# Patient Record
Sex: MALE | Race: WHITE | NOT HISPANIC OR LATINO | URBAN - METROPOLITAN AREA
[De-identification: names, ages, dates, MRNs, and addresses within clinical notes are randomized per-mention and may not be internally consistent; named-entity substitution may affect disease eponyms.]

---

## 2017-05-17 ENCOUNTER — EMERGENCY (EMERGENCY)
Facility: HOSPITAL | Age: 42
LOS: 1 days | Discharge: HOME | End: 2017-05-17
Attending: EMERGENCY MEDICINE | Admitting: EMERGENCY MEDICINE
Payer: COMMERCIAL

## 2017-05-17 VITALS
HEART RATE: 72 BPM | DIASTOLIC BLOOD PRESSURE: 82 MMHG | RESPIRATION RATE: 16 BRPM | TEMPERATURE: 98 F | SYSTOLIC BLOOD PRESSURE: 154 MMHG | OXYGEN SATURATION: 100 %

## 2017-05-17 VITALS
DIASTOLIC BLOOD PRESSURE: 80 MMHG | HEART RATE: 76 BPM | OXYGEN SATURATION: 100 % | RESPIRATION RATE: 18 BRPM | SYSTOLIC BLOOD PRESSURE: 164 MMHG | TEMPERATURE: 98 F

## 2017-05-17 DIAGNOSIS — Z98.84 BARIATRIC SURGERY STATUS: Chronic | ICD-10-CM

## 2017-05-17 DIAGNOSIS — Z90.49 ACQUIRED ABSENCE OF OTHER SPECIFIED PARTS OF DIGESTIVE TRACT: Chronic | ICD-10-CM

## 2017-05-17 LAB
ALBUMIN SERPL ELPH-MCNC: 3.8 G/DL — SIGNIFICANT CHANGE UP (ref 3.3–5)
ALP SERPL-CCNC: 196 U/L — HIGH (ref 40–120)
ALT FLD-CCNC: 44 U/L — HIGH (ref 4–41)
APPEARANCE UR: CLEAR — SIGNIFICANT CHANGE UP
AST SERPL-CCNC: 51 U/L — HIGH (ref 4–40)
BASOPHILS # BLD AUTO: 0.04 K/UL — SIGNIFICANT CHANGE UP (ref 0–0.2)
BASOPHILS NFR BLD AUTO: 0.6 % — SIGNIFICANT CHANGE UP (ref 0–2)
BILIRUB SERPL-MCNC: 0.2 MG/DL — SIGNIFICANT CHANGE UP (ref 0.2–1.2)
BILIRUB UR-MCNC: NEGATIVE — SIGNIFICANT CHANGE UP
BLOOD UR QL VISUAL: NEGATIVE — SIGNIFICANT CHANGE UP
BUN SERPL-MCNC: 11 MG/DL — SIGNIFICANT CHANGE UP (ref 7–23)
CALCIUM SERPL-MCNC: 9.1 MG/DL — SIGNIFICANT CHANGE UP (ref 8.4–10.5)
CHLORIDE SERPL-SCNC: 99 MMOL/L — SIGNIFICANT CHANGE UP (ref 98–107)
CO2 SERPL-SCNC: 28 MMOL/L — SIGNIFICANT CHANGE UP (ref 22–31)
COLOR SPEC: SIGNIFICANT CHANGE UP
CREAT SERPL-MCNC: 0.82 MG/DL — SIGNIFICANT CHANGE UP (ref 0.5–1.3)
EOSINOPHIL # BLD AUTO: 0.1 K/UL — SIGNIFICANT CHANGE UP (ref 0–0.5)
EOSINOPHIL NFR BLD AUTO: 1.6 % — SIGNIFICANT CHANGE UP (ref 0–6)
GLUCOSE SERPL-MCNC: 88 MG/DL — SIGNIFICANT CHANGE UP (ref 70–99)
GLUCOSE UR-MCNC: NEGATIVE — SIGNIFICANT CHANGE UP
HCT VFR BLD CALC: 41 % — SIGNIFICANT CHANGE UP (ref 39–50)
HGB BLD-MCNC: 13.4 G/DL — SIGNIFICANT CHANGE UP (ref 13–17)
IMM GRANULOCYTES NFR BLD AUTO: 0.3 % — SIGNIFICANT CHANGE UP (ref 0–1.5)
KETONES UR-MCNC: NEGATIVE — SIGNIFICANT CHANGE UP
LEUKOCYTE ESTERASE UR-ACNC: NEGATIVE — SIGNIFICANT CHANGE UP
LYMPHOCYTES # BLD AUTO: 1.41 K/UL — SIGNIFICANT CHANGE UP (ref 1–3.3)
LYMPHOCYTES # BLD AUTO: 21.9 % — SIGNIFICANT CHANGE UP (ref 13–44)
MCHC RBC-ENTMCNC: 26.7 PG — LOW (ref 27–34)
MCHC RBC-ENTMCNC: 32.7 % — SIGNIFICANT CHANGE UP (ref 32–36)
MCV RBC AUTO: 81.7 FL — SIGNIFICANT CHANGE UP (ref 80–100)
MONOCYTES # BLD AUTO: 0.42 K/UL — SIGNIFICANT CHANGE UP (ref 0–0.9)
MONOCYTES NFR BLD AUTO: 6.5 % — SIGNIFICANT CHANGE UP (ref 2–14)
MUCOUS THREADS # UR AUTO: SIGNIFICANT CHANGE UP
NEUTROPHILS # BLD AUTO: 4.44 K/UL — SIGNIFICANT CHANGE UP (ref 1.8–7.4)
NEUTROPHILS NFR BLD AUTO: 69.1 % — SIGNIFICANT CHANGE UP (ref 43–77)
NITRITE UR-MCNC: NEGATIVE — SIGNIFICANT CHANGE UP
PH UR: 6 — SIGNIFICANT CHANGE UP (ref 4.6–8)
PLATELET # BLD AUTO: 246 K/UL — SIGNIFICANT CHANGE UP (ref 150–400)
PMV BLD: 10.3 FL — SIGNIFICANT CHANGE UP (ref 7–13)
POTASSIUM SERPL-MCNC: 4.5 MMOL/L — SIGNIFICANT CHANGE UP (ref 3.5–5.3)
POTASSIUM SERPL-SCNC: 4.5 MMOL/L — SIGNIFICANT CHANGE UP (ref 3.5–5.3)
PROT SERPL-MCNC: 7.5 G/DL — SIGNIFICANT CHANGE UP (ref 6–8.3)
PROT UR-MCNC: NEGATIVE — SIGNIFICANT CHANGE UP
RBC # BLD: 5.02 M/UL — SIGNIFICANT CHANGE UP (ref 4.2–5.8)
RBC # FLD: 14.4 % — SIGNIFICANT CHANGE UP (ref 10.3–14.5)
RBC CASTS # UR COMP ASSIST: SIGNIFICANT CHANGE UP (ref 0–?)
SODIUM SERPL-SCNC: 140 MMOL/L — SIGNIFICANT CHANGE UP (ref 135–145)
SP GR SPEC: 1.01 — SIGNIFICANT CHANGE UP (ref 1–1.03)
UROBILINOGEN FLD QL: NORMAL E.U. — SIGNIFICANT CHANGE UP (ref 0.1–0.2)
WBC # BLD: 6.43 K/UL — SIGNIFICANT CHANGE UP (ref 3.8–10.5)
WBC # FLD AUTO: 6.43 K/UL — SIGNIFICANT CHANGE UP (ref 3.8–10.5)

## 2017-05-17 PROCEDURE — 99284 EMERGENCY DEPT VISIT MOD MDM: CPT

## 2017-05-17 NOTE — ED ADULT TRIAGE NOTE - CHIEF COMPLAINT QUOTE
Pt was feeling dizzy and co-workers called EMS--[id number]. Pt given something to eat [id number] at ER. Pt had bariatric surgery 9yrs ago

## 2017-05-17 NOTE — ED ADULT NURSE NOTE - OBJECTIVE STATEMENT
pt with a c/o Hypoglycemia , pt with a f/s of 36 in the field, pt given food. pt with a pmhx of gastric bypass.

## 2017-05-17 NOTE — ED PROVIDER NOTE - MEDICAL DECISION MAKING DETAILS
Pt is a 43 yo M, h/o gastric bypass, and, since then, recurrent e/o hypoglycemia. Today, minimal PO intake, had an e/o diffuse weakness which felt c/w previous periods of hypoglycemia. EMS noted blood glucose 34, administered PO glucose. Also noted slightly elevated BP with SBP 180s. In ED, Pt ambulatory, notes feeling improved. blood pressure improved on arrival to 164/80, which is c/w baseline BPs reported per Pt. Given h/o bypass with recurrent hypoglycemia (c/w post gastric bypass hypoglycemia) and previous evaluation per PCP, will evaluate in ED with labs (CBC, CMP), give Pt PO (including dextrose containing beverages), re-evaluate blood glucose. If no acute abnormalities, plan to discharge with close PCP f/u. Furthermore, in ED, discussed the importance of having close-at-hand either glucose tabs or similar rapid delivery systems when hypoglycemic. Also discussed tobacco cessation. Baugh: 43 yo M, h/o gastric bypass, and, since then, recurrent e/o hypoglycemia. Today, minimal PO intake, had an e/o diffuse weakness which felt c/w previous periods of hypoglycemia. EMS noted blood glucose 34, administered PO glucose. Also noted slightly elevated BP with SBP 180s. In ED, Pt ambulatory, notes feeling improved. blood pressure improved on arrival to 164/80, which is c/w baseline BPs reported per Pt. Given h/o bypass with recurrent hypoglycemia (c/w post gastric bypass hypoglycemia) and previous evaluation per PCP, will evaluate in ED with labs (CBC, CMP), give Pt PO (including dextrose containing beverages), re-evaluate blood glucose. If no acute abnormalities, plan to discharge with close PCP f/u. Furthermore, in ED, discussed the importance of having close-at-hand either glucose tabs or similar rapid delivery systems when hypoglycemic. Also discussed tobacco cessation. Baugh: 43 yo M, h/o gastric bypass, and, since then, recurrent e/o hypoglycemia. Today, minimal PO intake, had an e/o diffuse weakness which felt c/w previous periods of hypoglycemia. EMS noted blood glucose 34, administered PO glucose. Also noted slightly elevated BP with SBP 180s. In ED, Pt ambulatory, notes feeling improved. blood pressure improved on arrival to 164/80, which is c/w baseline BPs reported per Pt. Given h/o bypass with recurrent hypoglycemia (c/w post gastric bypass hypoglycemia) and previous evaluation per PCP, will evaluate in ED with labs (CBC, CMP), give Pt PO (including dextrose containing beverages), re-evaluate blood glucose. No acute abnormalities, plan to discharge with close PCP f/u. 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 Furthermore, in ED, discussed the importance of having close-at-hand either glucose tabs or similar rapid delivery systems when hypoglycemic. Also discussed tobacco cessation and dietary modifications (Pt reports erratic meals, but large sugar intake).

## 2017-05-17 NOTE — ED PROVIDER NOTE - OBJECTIVE STATEMENT
Pt is a 41 yo M, h/o [place] en Y gastric bypass (8 y ago), who presents to the ED via EMS for an episode of hypoglycemia. Had cereal this AM, but reports frequent skipping of meals. Felt diffuse malaise, had some diaphoresis, and said this felt like his recurrent hypoglycemia - he did not have glucose tabs/glucagon/etc on hand. Per EMS, blood glucose was 34. He was also noted to have SBP 180s (Pt reports normal BP 160s/80s). He denies any chest pain or SOB, no syncope, no focal weakness. States this is c/w previous/recurrent hypoglycemia. Has been followed by his PCP, and reports negative w/u for DM.   Pt received PO glucose per EMS, and reports feeling improved at this time.

## 2017-09-19 PROBLEM — Z00.00 ENCOUNTER FOR PREVENTIVE HEALTH EXAMINATION: Status: ACTIVE | Noted: 2017-09-19

## 2017-09-29 ENCOUNTER — APPOINTMENT (OUTPATIENT)
Dept: PAIN MANAGEMENT | Facility: CLINIC | Age: 42
End: 2017-09-29
Payer: COMMERCIAL

## 2017-09-29 VITALS
HEIGHT: 73 IN | SYSTOLIC BLOOD PRESSURE: 152 MMHG | BODY MASS INDEX: 41.75 KG/M2 | DIASTOLIC BLOOD PRESSURE: 98 MMHG | HEART RATE: 69 BPM | WEIGHT: 315 LBS

## 2017-09-29 DIAGNOSIS — R51 HEADACHE: ICD-10-CM

## 2017-09-29 PROCEDURE — 99204 OFFICE O/P NEW MOD 45 MIN: CPT

## 2017-09-29 RX ORDER — LOSARTAN POTASSIUM 50 MG/1
50 TABLET, FILM COATED ORAL
Refills: 0 | Status: ACTIVE | COMMUNITY

## 2017-09-29 RX ORDER — OMEPRAZOLE 20 MG/1
20 CAPSULE, DELAYED RELEASE ORAL
Refills: 0 | Status: ACTIVE | COMMUNITY

## 2017-09-29 RX ORDER — BUPRENORPHINE HYDROCHLORIDE 2 MG/1
2 TABLET SUBLINGUAL
Refills: 0 | Status: ACTIVE | COMMUNITY

## 2017-09-29 RX ORDER — FLUOXETINE HYDROCHLORIDE 40 MG/1
40 CAPSULE ORAL
Refills: 0 | Status: ACTIVE | COMMUNITY

## 2017-09-29 RX ORDER — VENLAFAXINE HYDROCHLORIDE 150 MG/1
150 CAPSULE, EXTENDED RELEASE ORAL
Refills: 0 | Status: ACTIVE | COMMUNITY

## 2018-09-04 PROBLEM — E16.2 HYPOGLYCEMIA, UNSPECIFIED: Chronic | Status: ACTIVE | Noted: 2017-05-17

## 2018-09-04 PROBLEM — K91.1 POSTGASTRIC SURGERY SYNDROMES: Chronic | Status: ACTIVE | Noted: 2017-05-17

## 2018-09-13 ENCOUNTER — APPOINTMENT (OUTPATIENT)
Dept: PAIN MANAGEMENT | Facility: CLINIC | Age: 43
End: 2018-09-13

## 2020-09-22 ENCOUNTER — APPOINTMENT (OUTPATIENT)
Dept: PAIN MANAGEMENT | Facility: CLINIC | Age: 45
End: 2020-09-22